# Patient Record
Sex: FEMALE | URBAN - METROPOLITAN AREA
[De-identification: names, ages, dates, MRNs, and addresses within clinical notes are randomized per-mention and may not be internally consistent; named-entity substitution may affect disease eponyms.]

---

## 2022-04-29 LAB
ANTIBODY SCREEN, EXTERNAL: NEGATIVE
CHLAMYDIA, EXTERNAL: NEGATIVE
HBSAG, EXTERNAL: NEGATIVE
HEPATITIS C AB,   EXT: NEGATIVE
HIV, EXTERNAL: NORMAL
N. GONORRHEA, EXTERNAL: NEGATIVE
RUBELLA, EXTERNAL: NORMAL
TYPE, ABO & RH, EXTERNAL: NORMAL

## 2022-10-31 LAB — GRBS, EXTERNAL: NEGATIVE

## 2022-11-27 ENCOUNTER — ANESTHESIA EVENT (OUTPATIENT)
Dept: LABOR AND DELIVERY | Age: 32
End: 2022-11-27

## 2022-11-27 ENCOUNTER — HOSPITAL ENCOUNTER (INPATIENT)
Age: 32
LOS: 2 days | Discharge: HOME | End: 2022-11-29
Attending: OBSTETRICS & GYNECOLOGY | Admitting: OBSTETRICS & GYNECOLOGY

## 2022-11-27 ENCOUNTER — ANESTHESIA (OUTPATIENT)
Dept: LABOR AND DELIVERY | Age: 32
End: 2022-11-27

## 2022-11-27 PROBLEM — Z3A.39 39 WEEKS GESTATION OF PREGNANCY: Status: ACTIVE | Noted: 2022-11-27

## 2022-11-27 LAB
ALBUMIN SERPL-MCNC: 2.7 G/DL (ref 3.5–5)
ALBUMIN/GLOB SERPL: 0.8 {RATIO} (ref 1.1–2.2)
ALP SERPL-CCNC: 154 U/L (ref 45–117)
ALT SERPL-CCNC: 43 U/L (ref 12–78)
ANION GAP SERPL CALC-SCNC: 8 MMOL/L (ref 5–15)
AST SERPL-CCNC: 33 U/L (ref 15–37)
BILIRUB SERPL-MCNC: 0.3 MG/DL (ref 0.2–1)
BUN SERPL-MCNC: 9 MG/DL (ref 6–20)
BUN/CREAT SERPL: 13 (ref 12–20)
CALCIUM SERPL-MCNC: 9.6 MG/DL (ref 8.5–10.1)
CHLORIDE SERPL-SCNC: 106 MMOL/L (ref 97–108)
CO2 SERPL-SCNC: 25 MMOL/L (ref 21–32)
CREAT SERPL-MCNC: 0.68 MG/DL (ref 0.55–1.02)
CREAT UR-MCNC: 208 MG/DL
ERYTHROCYTE [DISTWIDTH] IN BLOOD BY AUTOMATED COUNT: 13.2 % (ref 11.5–14.5)
GLOBULIN SER CALC-MCNC: 3.3 G/DL (ref 2–4)
GLUCOSE SERPL-MCNC: 89 MG/DL (ref 65–100)
HCT VFR BLD AUTO: 36.4 % (ref 35–47)
HGB BLD-MCNC: 12 G/DL (ref 11.5–16)
MCH RBC QN AUTO: 28 PG (ref 26–34)
MCHC RBC AUTO-ENTMCNC: 33 G/DL (ref 30–36.5)
MCV RBC AUTO: 84.8 FL (ref 80–99)
NRBC # BLD: 0 K/UL (ref 0–0.01)
NRBC BLD-RTO: 0 PER 100 WBC
PLATELET # BLD AUTO: 174 K/UL (ref 150–400)
PMV BLD AUTO: 11.4 FL (ref 8.9–12.9)
POTASSIUM SERPL-SCNC: 4.3 MMOL/L (ref 3.5–5.1)
PROT SERPL-MCNC: 6 G/DL (ref 6.4–8.2)
PROT UR-MCNC: 48 MG/DL (ref 0–11.9)
PROT/CREAT UR-RTO: 0.2
RBC # BLD AUTO: 4.29 M/UL (ref 3.8–5.2)
SODIUM SERPL-SCNC: 139 MMOL/L (ref 136–145)
WBC # BLD AUTO: 7.5 K/UL (ref 3.6–11)

## 2022-11-27 PROCEDURE — 65410000002 HC RM PRIVATE OB

## 2022-11-27 PROCEDURE — 74011250637 HC RX REV CODE- 250/637: Performed by: OBSTETRICS & GYNECOLOGY

## 2022-11-27 PROCEDURE — 75410000003 HC RECOV DEL/VAG/CSECN EA 0.5 HR

## 2022-11-27 PROCEDURE — A4300 CATH IMPL VASC ACCESS PORTAL: HCPCS

## 2022-11-27 PROCEDURE — 85027 COMPLETE CBC AUTOMATED: CPT

## 2022-11-27 PROCEDURE — 84156 ASSAY OF PROTEIN URINE: CPT

## 2022-11-27 PROCEDURE — 74011000250 HC RX REV CODE- 250: Performed by: ANESTHESIOLOGY

## 2022-11-27 PROCEDURE — 59025 FETAL NON-STRESS TEST: CPT

## 2022-11-27 PROCEDURE — 76060000078 HC EPIDURAL ANESTHESIA

## 2022-11-27 PROCEDURE — 36415 COLL VENOUS BLD VENIPUNCTURE: CPT

## 2022-11-27 PROCEDURE — 0HQ9XZZ REPAIR PERINEUM SKIN, EXTERNAL APPROACH: ICD-10-PCS | Performed by: OBSTETRICS & GYNECOLOGY

## 2022-11-27 PROCEDURE — 0UQGXZZ REPAIR VAGINA, EXTERNAL APPROACH: ICD-10-PCS | Performed by: OBSTETRICS & GYNECOLOGY

## 2022-11-27 PROCEDURE — 75410000002 HC LABOR FEE PER 1 HR

## 2022-11-27 PROCEDURE — 74011250637 HC RX REV CODE- 250/637: Performed by: ADVANCED PRACTICE MIDWIFE

## 2022-11-27 PROCEDURE — 74011250636 HC RX REV CODE- 250/636: Performed by: OBSTETRICS & GYNECOLOGY

## 2022-11-27 PROCEDURE — 99283 EMERGENCY DEPT VISIT LOW MDM: CPT

## 2022-11-27 PROCEDURE — 80053 COMPREHEN METABOLIC PANEL: CPT

## 2022-11-27 PROCEDURE — 75410000000 HC DELIVERY VAGINAL/SINGLE

## 2022-11-27 PROCEDURE — 0UQJXZZ REPAIR CLITORIS, EXTERNAL APPROACH: ICD-10-PCS | Performed by: OBSTETRICS & GYNECOLOGY

## 2022-11-27 RX ORDER — MAG HYDROX/ALUMINUM HYD/SIMETH 200-200-20
30 SUSPENSION, ORAL (FINAL DOSE FORM) ORAL
Status: DISCONTINUED | OUTPATIENT
Start: 2022-11-27 | End: 2022-11-27

## 2022-11-27 RX ORDER — OXYTOCIN/RINGER'S LACTATE 30/500 ML
10 PLASTIC BAG, INJECTION (ML) INTRAVENOUS AS NEEDED
Status: COMPLETED | OUTPATIENT
Start: 2022-11-27 | End: 2022-11-27

## 2022-11-27 RX ORDER — FENTANYL CITRATE 50 UG/ML
100 INJECTION, SOLUTION INTRAMUSCULAR; INTRAVENOUS ONCE
Status: DISCONTINUED | OUTPATIENT
Start: 2022-11-27 | End: 2022-11-27

## 2022-11-27 RX ORDER — TERBUTALINE SULFATE 1 MG/ML
0.25 INJECTION SUBCUTANEOUS AS NEEDED
Status: DISCONTINUED | OUTPATIENT
Start: 2022-11-27 | End: 2022-11-27

## 2022-11-27 RX ORDER — NALBUPHINE HYDROCHLORIDE 20 MG/ML
10 INJECTION, SOLUTION INTRAMUSCULAR; INTRAVENOUS; SUBCUTANEOUS
Status: DISCONTINUED | OUTPATIENT
Start: 2022-11-27 | End: 2022-11-27

## 2022-11-27 RX ORDER — IBUPROFEN 400 MG/1
800 TABLET ORAL EVERY 8 HOURS
Status: DISCONTINUED | OUTPATIENT
Start: 2022-11-27 | End: 2022-11-29 | Stop reason: HOSPADM

## 2022-11-27 RX ORDER — HYDROCODONE BITARTRATE AND ACETAMINOPHEN 5; 325 MG/1; MG/1
1 TABLET ORAL
Status: DISCONTINUED | OUTPATIENT
Start: 2022-11-27 | End: 2022-11-29 | Stop reason: HOSPADM

## 2022-11-27 RX ORDER — BUPIVACAINE HYDROCHLORIDE 2.5 MG/ML
INJECTION, SOLUTION EPIDURAL; INFILTRATION; INTRACAUDAL AS NEEDED
Status: DISCONTINUED | OUTPATIENT
Start: 2022-11-27 | End: 2022-11-27 | Stop reason: HOSPADM

## 2022-11-27 RX ORDER — NALOXONE HYDROCHLORIDE 0.4 MG/ML
0.4 INJECTION, SOLUTION INTRAMUSCULAR; INTRAVENOUS; SUBCUTANEOUS AS NEEDED
Status: DISCONTINUED | OUTPATIENT
Start: 2022-11-27 | End: 2022-11-27

## 2022-11-27 RX ORDER — NALBUPHINE HYDROCHLORIDE 20 MG/ML
5 INJECTION, SOLUTION INTRAMUSCULAR; INTRAVENOUS; SUBCUTANEOUS
Status: DISCONTINUED | OUTPATIENT
Start: 2022-11-27 | End: 2022-11-27

## 2022-11-27 RX ORDER — OXYTOCIN/RINGER'S LACTATE 30/500 ML
87.3 PLASTIC BAG, INJECTION (ML) INTRAVENOUS AS NEEDED
Status: DISCONTINUED | OUTPATIENT
Start: 2022-11-27 | End: 2022-11-27

## 2022-11-27 RX ORDER — ONDANSETRON 2 MG/ML
4 INJECTION INTRAMUSCULAR; INTRAVENOUS ONCE
Status: ACTIVE | OUTPATIENT
Start: 2022-11-27 | End: 2022-11-27

## 2022-11-27 RX ORDER — FOLIC ACID/MULTIVIT,IRON,MINER 0.4MG-18MG
1 TABLET ORAL DAILY
Status: DISCONTINUED | OUTPATIENT
Start: 2022-11-28 | End: 2022-11-29 | Stop reason: HOSPADM

## 2022-11-27 RX ORDER — LIDOCAINE HYDROCHLORIDE AND EPINEPHRINE 15; 5 MG/ML; UG/ML
INJECTION, SOLUTION EPIDURAL AS NEEDED
Status: DISCONTINUED | OUTPATIENT
Start: 2022-11-27 | End: 2022-11-27 | Stop reason: HOSPADM

## 2022-11-27 RX ORDER — CALCIUM CARBONATE 200(500)MG
400 TABLET,CHEWABLE ORAL AS NEEDED
Status: DISCONTINUED | OUTPATIENT
Start: 2022-11-27 | End: 2022-11-27

## 2022-11-27 RX ORDER — ACETAMINOPHEN 325 MG/1
650 TABLET ORAL
Status: DISCONTINUED | OUTPATIENT
Start: 2022-11-27 | End: 2022-11-29 | Stop reason: HOSPADM

## 2022-11-27 RX ORDER — ASPIRIN 81 MG/1
TABLET ORAL DAILY
COMMUNITY
End: 2022-11-29

## 2022-11-27 RX ORDER — BUPIVACAINE HYDROCHLORIDE 2.5 MG/ML
INJECTION, SOLUTION EPIDURAL; INFILTRATION; INTRACAUDAL
Status: COMPLETED
Start: 2022-11-27 | End: 2022-11-27

## 2022-11-27 RX ORDER — FENTANYL/BUPIVACAINE/NS/PF 2-1250MCG
1-16 PREFILLED PUMP RESERVOIR EPIDURAL CONTINUOUS
Status: DISCONTINUED | OUTPATIENT
Start: 2022-11-27 | End: 2022-11-27

## 2022-11-27 RX ORDER — OXYTOCIN/RINGER'S LACTATE 30/500 ML
87.3 PLASTIC BAG, INJECTION (ML) INTRAVENOUS AS NEEDED
Status: DISCONTINUED | OUTPATIENT
Start: 2022-11-27 | End: 2022-11-29 | Stop reason: HOSPADM

## 2022-11-27 RX ORDER — ONDANSETRON 2 MG/ML
INJECTION INTRAMUSCULAR; INTRAVENOUS
Status: DISCONTINUED
Start: 2022-11-27 | End: 2022-11-27 | Stop reason: WASHOUT

## 2022-11-27 RX ORDER — HYDROCORTISONE ACETATE PRAMOXINE HCL 2.5; 1 G/100G; G/100G
CREAM TOPICAL AS NEEDED
Status: DISCONTINUED | OUTPATIENT
Start: 2022-11-27 | End: 2022-11-29 | Stop reason: HOSPADM

## 2022-11-27 RX ORDER — NALOXONE HYDROCHLORIDE 0.4 MG/ML
0.4 INJECTION, SOLUTION INTRAMUSCULAR; INTRAVENOUS; SUBCUTANEOUS AS NEEDED
Status: DISCONTINUED | OUTPATIENT
Start: 2022-11-27 | End: 2022-11-29 | Stop reason: HOSPADM

## 2022-11-27 RX ORDER — OXYTOCIN/RINGER'S LACTATE 30/500 ML
10 PLASTIC BAG, INJECTION (ML) INTRAVENOUS AS NEEDED
Status: DISCONTINUED | OUTPATIENT
Start: 2022-11-27 | End: 2022-11-29 | Stop reason: HOSPADM

## 2022-11-27 RX ORDER — FENTANYL CITRATE 50 UG/ML
100 INJECTION, SOLUTION INTRAMUSCULAR; INTRAVENOUS
Status: DISCONTINUED | OUTPATIENT
Start: 2022-11-27 | End: 2022-11-27

## 2022-11-27 RX ORDER — SIMETHICONE 80 MG
80 TABLET,CHEWABLE ORAL
Status: DISCONTINUED | OUTPATIENT
Start: 2022-11-27 | End: 2022-11-29 | Stop reason: HOSPADM

## 2022-11-27 RX ORDER — SODIUM CHLORIDE, SODIUM LACTATE, POTASSIUM CHLORIDE, CALCIUM CHLORIDE 600; 310; 30; 20 MG/100ML; MG/100ML; MG/100ML; MG/100ML
25 INJECTION, SOLUTION INTRAVENOUS CONTINUOUS
Status: DISCONTINUED | OUTPATIENT
Start: 2022-11-27 | End: 2022-11-27

## 2022-11-27 RX ORDER — NORETHINDRONE AND ETHINYL ESTRADIOL 0.5-0.035
10 KIT ORAL ONCE
Status: DISCONTINUED | OUTPATIENT
Start: 2022-11-27 | End: 2022-11-27

## 2022-11-27 RX ORDER — NORETHINDRONE AND ETHINYL ESTRADIOL 0.5-0.035
KIT ORAL
Status: DISCONTINUED
Start: 2022-11-27 | End: 2022-11-27 | Stop reason: WASHOUT

## 2022-11-27 RX ADMIN — ACETAMINOPHEN 650 MG: 325 TABLET ORAL at 23:33

## 2022-11-27 RX ADMIN — OXYTOCIN 10000 MILLI-UNITS: 10 INJECTION, SOLUTION INTRAMUSCULAR; INTRAVENOUS at 14:44

## 2022-11-27 RX ADMIN — Medication 10 ML/HR: at 05:11

## 2022-11-27 RX ADMIN — ALUMINUM HYDROXIDE, MAGNESIUM HYDROXIDE, AND SIMETHICONE 30 ML: 200; 200; 20 SUSPENSION ORAL at 08:45

## 2022-11-27 RX ADMIN — ACETAMINOPHEN 650 MG: 325 TABLET ORAL at 17:19

## 2022-11-27 RX ADMIN — CALCIUM CARBONATE 400 MG: 500 TABLET, CHEWABLE ORAL at 07:25

## 2022-11-27 RX ADMIN — Medication 10 ML/HR: at 11:45

## 2022-11-27 RX ADMIN — FENTANYL CITRATE 50 MCG: 50 INJECTION, SOLUTION INTRAMUSCULAR; INTRAVENOUS at 04:57

## 2022-11-27 RX ADMIN — SODIUM CHLORIDE, POTASSIUM CHLORIDE, SODIUM LACTATE AND CALCIUM CHLORIDE 1000 ML: 600; 310; 30; 20 INJECTION, SOLUTION INTRAVENOUS at 05:00

## 2022-11-27 RX ADMIN — LIDOCAINE HYDROCHLORIDE,EPINEPHRINE BITARTRATE 1 ML: 15; .005 INJECTION, SOLUTION EPIDURAL; INFILTRATION; INTRACAUDAL; PERINEURAL at 04:57

## 2022-11-27 RX ADMIN — BUPIVACAINE HYDROCHLORIDE 1 ML: 2.5 INJECTION, SOLUTION EPIDURAL; INFILTRATION; INTRACAUDAL; PERINEURAL at 04:57

## 2022-11-27 RX ADMIN — CALCIUM CARBONATE 400 MG: 500 TABLET, CHEWABLE ORAL at 02:00

## 2022-11-27 RX ADMIN — IBUPROFEN 800 MG: 400 TABLET, FILM COATED ORAL at 17:19

## 2022-11-27 NOTE — ANESTHESIA PREPROCEDURE EVALUATION
Relevant Problems   No relevant active problems       Anesthetic History               Review of Systems / Medical History  Patient summary reviewed, nursing notes reviewed and pertinent labs reviewed    Pulmonary            Asthma        Neuro/Psych              Cardiovascular  Within defined limits                Exercise tolerance: >4 METS     GI/Hepatic/Renal  Within defined limits              Endo/Other  Within defined limits           Other Findings              Physical Exam    Airway  Mallampati: II  TM Distance: 4 - 6 cm  Neck ROM: normal range of motion        Cardiovascular  Regular rate and rhythm,  S1 and S2 normal,  no murmur, click, rub, or gallop             Dental  No notable dental hx       Pulmonary                 Abdominal         Other Findings            Anesthetic Plan    ASA: 2  Anesthesia type: CSE            Anesthetic plan and risks discussed with: Patient

## 2022-11-27 NOTE — ANESTHESIA PROCEDURE NOTES
CSE Block    Start time: 11/27/2022 4:47 AM  End time: 11/27/2022 5:01 AM  Performed by: Froylan Soni DO  Authorized by: Froylan Soin DO     Pre-Procedure  Indications: procedure for pain    preanesthetic checklist: patient identified, risks and benefits discussed, anesthesia consent, site marked, patient being monitored, timeout performed and fire risk safety assessment completed and verbalized      Procedure:   Patient Position:  Seated  Prep Region:  Lumbar  Prep: chlorhexidine    Location:  L3-4    Epidural Needle:   Needle Type:  Tuohy  Needle Gauge:  17 G  Injection Technique:  Loss of resistance using air  Attempts:  1    Spinal Needle:   Needle Type:  Pencan  Needle Gauge:  25 G    Catheter:   Catheter Type:  Flex-tip  Catheter Size:  18 G  Catheter at Skin Depth (cm):  10.5  Depth in Epidural Space (cm):  4  Events: no blood with aspiration and CSF confirmed    Med Admin time: 11/27/2022 4:57 AM    Assessment:   Catheter Secured:  Tegaderm  Insertion:  Uncomplicated  Patient tolerance:  Patient tolerated the procedure well with no immediate complications  Meds:    2.0 ml of 1% lidocaine plain for skin & subQ    - 1.0 ml of 0.25% marcaine isobaric for CSE  - 1.0 ml of 1.5% lidocaine w/ epi 1:200,000 for test dose  - 50 mcg fentanyl in epidural

## 2022-11-27 NOTE — ED PROVIDER NOTES
34 yo  @ 40w1d, pt of 606/706 Mireille Nayak, presents with ROM around 11 PM. Clear fluid. Uncomplicated pregnancy. She did have COVID during pregnancy and was taking baby ASA. GBS neg. She did have mildly elevated LFTs during pregnancy which normalized at 36 weeks. PMedHx: Anxiety  PSURGHX: laparoscopic appendectomy    The history is provided by the patient. Chief Complaint   Patient presents with    Leakage/Loss of Fluid       No past medical history on file. No past surgical history on file. No family history on file. Social History     Socioeconomic History    Marital status: Not on file     Spouse name: Not on file    Number of children: Not on file    Years of education: Not on file    Highest education level: Not on file   Occupational History    Not on file   Tobacco Use    Smoking status: Not on file    Smokeless tobacco: Not on file   Substance and Sexual Activity    Alcohol use: Not on file    Drug use: Not on file    Sexual activity: Not on file   Other Topics Concern    Not on file   Social History Narrative    Not on file     Social Determinants of Health     Financial Resource Strain: Not on file   Food Insecurity: Not on file   Transportation Needs: Not on file   Physical Activity: Not on file   Stress: Not on file   Social Connections: Not on file   Intimate Partner Violence: Not on file   Housing Stability: Not on file         ALLERGIES: Patient has no known allergies. Review of Systems   All other systems reviewed and are negative. There were no vitals filed for this visit. Physical Exam  Vitals and nursing note reviewed. Exam conducted with a chaperone present. Constitutional:       General: She is not in acute distress. Appearance: Normal appearance. She is not ill-appearing. Cardiovascular:      Rate and Rhythm: Normal rate and regular rhythm. Pulmonary:      Effort: Pulmonary effort is normal.   Abdominal:      Palpations: Abdomen is soft.    Genitourinary: Comments: Grossly ruptured  Cvx 3/80/-2  Neurological:      Mental Status: She is alert. FHTs 130s, moderate variability, (+) accels, no decels, ctxs q3-5 min    MDM     Amount and/or Complexity of Data Reviewed  Review and summarize past medical records: yes    Risk of Complications, Morbidity, and/or Mortality  Presenting problems: moderate  Diagnostic procedures: moderate  Management options: moderate              ED Course as of 22 0025   Sun 2022   0024 34 yo  @ 40w1d SROM and early labor. Admit for birth.  [NR]      ED Course User Index  [NR] Lidia Cast MD       Procedures    @[facility]@

## 2022-11-27 NOTE — PROGRESS NOTES
0730-Bedside and Verbal shift change report given to SHARATH Patino RN  (oncoming nurse) by HIPOLITO Black RN  (offgoing nurse). Report included the following information SBAR.   0945-Straight cath done. P/C ratio sent. 0950-Pt feeling some discomfort on right side with contractions. Turned to right side and will use PCEA. 1020-Pt sleeping. 1300-RN at bedside, continuously monitoring FHR tracing   1330-RN at bedside, continuously monitoring FHR tracing   1400-RN at bedside, continuously monitoring FHR tracing   1430-RN at bedside, continuously monitoring FHR tracing  1439-, see delivery summary. 1705-Pt up to WC, no complaints voiced. 1710-TRANSFER - OUT REPORT:    Verbal report given to A Celso RN (name) on Suni Roberson  being transferred to MIU room 326 (unit) for routine progression of care       Report consisted of patients Situation, Background, Assessment and   Recommendations(SBAR). Information from the following report(s) SBAR was reviewed with the receiving nurse. Lines:   Peripheral IV 22 Left Hand (Active)        Opportunity for questions and clarification was provided.       Patient transported with:   Registered Nurse

## 2022-11-27 NOTE — PROGRESS NOTES
[facility] Labor Progress Note     Patient: Max Ontiveros MRN: [id number]  SSN: [id number]    YOB: 1990  Age: 33 y.o. Sex: female      Care assumed at 0800    Subjective:   Patient is comfortable with epidural.  and  are at bedside and are both supportive and encouraging. Objective:   Blood pressure 122/80, pulse 99, temperature 98 °F (36.7 °C), resp. rate 16, height 5' 6\" (1.676 m), weight 83.9 kg (185 lb), SpO2 98 %, not currently breastfeeding. Patient Vitals for the past 4 hrs: Mode Fetal Heart Rate Fetal Activity Variability Decelerations Accelerations RN Reviewed Strip? Non Stress Test   22 0817 US Readjusted; External 140 -- -- -- -- Yes --   22 0800 External 140 -- 6-25 BPM Variable Yes Yes --   22 0730 External 135 -- 6-25 BPM -- -- Yes --   22 0700 External 135 Present 6-25 BPM None Yes Yes Reactive   22 0630 External 130 Present 6-25 BPM -- -- Yes --   22 0600 External 130 Present 6-25 BPM -- Yes Yes Reactive   22 0530 External 140 Present 6-25 BPM -- -- Yes --   22 0514 External 140 Present 6-25 BPM Early -- Yes --      Uterine contractions q 2-4 minutes, strong to palpation, resting tone soft, Sterile Vaginal Exam: 7 cm dilated/ 100 % effaced/ +1 station, fetal presentation vertex, membranes ruptured for continued clear fluid    Assessment:     40w1d  Category 1 fetal heart rate tracing   Labor  SROM x 10 hours    Plan:   Introduced self to patient. SVE. Recheck cervix in 3 hours, sooner with maternal or fetal indication. Cont maternal and fetal monitoring per protocol.   Anticipate HERMES Alvarez

## 2022-11-27 NOTE — ROUTINE PROCESS
TRANSFER - IN REPORT:    Verbal report received from Nithya Bautista RN (name) on Kendra Shaver  being received from LD (unit) for routine progression of care      Report consisted of patients Situation, Background, Assessment and   Recommendations(SBAR). Information from the following report(s) SBAR was reviewed with the receiving nurse. Opportunity for questions and clarification was provided. Assessment completed upon patients arrival to unit and care assumed.

## 2022-11-27 NOTE — H&P
History & Physical    Name: Suni Roberson MRN: [id number]  SSN: [id number]    YOB: 1990  Age: 33 y.o. Sex: female        Subjective: SROM and labor     Estimated Date of Delivery: 22  OB History    Para Term  AB Living   1             SAB IAB Ectopic Molar Multiple Live Births                    # Outcome Date GA Lbr Tristan/2nd Weight Sex Delivery Anes PTL Lv   1 Current                MsRachel Rosado is a  admitted from [facility] with pregnancy at 40w1d for  ROM and early labor . Prenatal course was pretty uncomplicated. She did have COVID and was on a baby ASA. Also had mildly elevated LFTs which normalized. Please see prenatal records for details. H/o anxiety  H/o laparoscopic appy      Past Medical History:   Diagnosis Date    Asthma      Past Surgical History:   Procedure Laterality Date    HX OTHER SURGICAL      Sabrina bar     Social History     Occupational History    Not on file   Tobacco Use    Smoking status: Not on file    Smokeless tobacco: Not on file   Substance and Sexual Activity    Alcohol use: Not on file    Drug use: Not on file    Sexual activity: Not on file     No family history on file. No Known Allergies  Prior to Admission medications    Medication Sig Start Date End Date Taking? Authorizing Provider   PNV Comb #2-Iron-FA-Omega 3 29-1-400 mg cmpk Take  by mouth. Yes Provider, Historical   aspirin delayed-release 81 mg tablet Take  by mouth daily. Yes Provider, Historical        Review of Systems: A comprehensive review of systems was negative except for that written in the HPI. Objective:     Vitals: There were no vitals filed for this visit. Physical Exam:  Patient without distress. Heart: Regular rate and rhythm  Lung: normal respiratory effort  Abdomen: soft, nontender  Perineum: blood absent, amniotic fluid present  Cervical Exam: 3/70/-2  Membranes:  Spontaneous Rupture of Membranes;  Amniotic Fluid: clear fluid  Fetal Heart Rate: Reactive  Baseline: 130 per minute  Variability: moderate  Accelerations: yes  Decelerations: none  Uterine contractions: q2-5 min      Assessment/Plan:     Active Problems:    39 weeks gestation of pregnancy (2022)       34 yo  @ 40w1d SROM and early labor  Admit  GBS neg  She is open to epidural if she feels like she needs it  Mildly elevated first blood pressure, will cycle and check labs

## 2022-11-27 NOTE — L&D DELIVERY NOTE
CNM Delivery Note       Patient: Antonio Gonzalez MRN: [id number]  SSN: [id number]    YOB: 1990  Age: 33 y.o. Sex: female        Complete cervical dilation at 1257.  of a live female infant at 26 with Apgars 8,9 in ANNA position under epidural anesthesia with mother in stirrups position. Loose nuchal x 1 noted and easily reduced on perineum. Shoulders spontaneous, easily delivered with maternal effort. Vigorous infant placed on maternal abdomen immediately following delivery. Once cord stopped pulsating it was double clamped by CNM and cut by FOB. Placenta and membranes spontaneous, intact, with 3 vessel cord via Dayanara Balls mechanism at [phone]. Fundus massaged to firm. Vagina and perineum inspected. Right vaginal laceration noted, bleeding, repaired with one stitch of 3-0 vicryl to great hemostasis. 1st degree perineal laceration repaired with 2-0 vicryl rapide under epidural anesthesia to great approximation, hemostasis and cosmesis. Bilateral periurethral skin tears noted, hemostatic, no repair needed. Small laceration just under clitoris noted and bleeding, repaired with two stitches of 4-0 monocryl to great hemostasis. Bladder emptied in sterile fashion with a red rubber catheter. Mother and infant stable, bonding, establishing breastfeeding. Delivery Summary    Patient: Antonio Gonzalez MRN: [id number]  SSN: [id number]    YOB: 1990  Age: 33 y.o.   Sex: female       Information for the patient's :  Leah Boyd [[id number]]     Labor Events:    Labor: No    Steroids: None   Cervical Ripening Date/Time:       Cervical Ripening Type: None   Antibiotics During Labor:     Rupture Identifier:      Rupture Date/Time: 2022 11:00 PM   Rupture Type: SROM   Amniotic Fluid Volume:      Amniotic Fluid Description:      Amniotic Fluid Odor:      Induction: None       Induction Date/Time:        Indications for Induction:      Augmentation: None Augmentation Date/Time:      Indications for Augmentation:     Labor complications: None       Additional complications:        Delivery Events:  Indications For Episiotomy:     Episiotomy: None   Perineal Laceration(s): 1st   Repaired: Yes   Periurethral Laceration Location:      Repaired: Yes   Labial Laceration Location:     Repaired:     Sulcal Laceration Location:     Repaired:     Vaginal Laceration Location: right   Repaired: Yes   Cervical Laceration Location:     Repaired:     Repair Suture: Vicryl 2-0;Vicryl 3-0;Monocryl 3-0   Number of Repair Packets: 3   Estimated Blood Loss (ml):  ml   Quantitative Blood Loss (ml)                Delivery Date: 2022    Delivery Time: 2:39 PM  Delivery Type: Vaginal, Spontaneous  Sex:  Female    Gestational Age: 44w3d   Delivery Clinician:  Marvin Torres  Living Status: Living   Delivery Location: L&D            APGARS  One minute Five minutes Ten minutes   Skin color: 0   1        Heart rate: 2   2        Grimace: 2   2        Muscle tone: 2   2        Breathin   2        Totals: 8   9            Presentation: Vertex    Position: Left Occiput Anterior  Resuscitation Method:  Suctioning-bulb; Tactile Stimulation     Meconium Stained: Terminal      Cord Information: 3 Vessels  Complications: Nuchal Cord Without Compressions  Cord around: head  Delayed cord clamping? Yes  Cord clamped date/time:2022  2:40 PM  Disposition of Cord Blood:      Blood Gases Sent?: No    Placenta:  Date/Time: 2022  2:45 PM  Removal: Spontaneous      Appearance: Normal;Intact      Measurements:  Birth Weight:        Birth Length:        Head Circumference:        Chest Circumference:       Abdominal Girth:       Other Providers:   ;BECKY Chou;;;;;;Birt GRIJALVA, Obstetrician;Primary Nurse;Primary [place] Nurse;Nicu Nurse;Neonatologist;Anesthesiologist;Crna;Nurse Practitioner;Midwife;Nursery Nurse         Group B Strep:   Lab Results   Component Value Date/Time    GrBStrep, External Negative 10/31/2022 12:00 AM     Information for the patient's :  Meena Lacy [[id number]]   No results found for: ABORH, PCTABR, PCTDIG, BILI, ABORHEXT, ABORH   No results for input(s): PCO2CB, PO2CB, HCO3I, SO2I, IBD, PTEMPI, SPECTI, PHICB, ISITE, IDEV, IALLEN in the last 72 hours.

## 2022-11-27 NOTE — PROGRESS NOTES
[facility] Labor Progress Note     Patient: Wero Waldron MRN: [id number]  SSN: [id number]    YOB: 1990  Age: 33 y.o. Sex: female        Subjective:   Patient continues to be comfortable with epidural, feeling a lot of pressure.  and  continue to offer support and encouragement at bedside. Objective:   Blood pressure 122/65, pulse (!) 109, temperature 98.3 °F (36.8 °C), resp. rate 16, height 5' 6\" (1.676 m), weight 83.9 kg (185 lb), SpO2 99 %, not currently breastfeeding. Patient Vitals for the past 4 hrs: Mode Fetal Heart Rate Variability Decelerations Accelerations RN Reviewed Strip?   22 1315 External 135 6-25 BPM None Yes Yes   22 1259 External 140 6-25 BPM None Yes Yes   22 1230 External 145 6-25 BPM None Yes Yes   22 1200 External 140 6-25 BPM None No Yes   22 1137 US Readjusted 145 -- -- -- --   22 1129 External 145 6-25 BPM None Yes Yes   22 1100 External 135 6-25 BPM None Yes Yes   22 1029 External 145 (!) Less than or equal to 5 BPM Early No Yes   22 0959 External 150 6-25 BPM None Yes Yes   22 0929 External 145 6-25 BPM None Yes Yes      Uterine contractions q 2-5 minutes,  strong to palpation, resting tone soft, Sterile Vaginal Exam: 10 cm dilated/ 100 % effaced/ +2 station, fetal presentation vertex, membranes ruptured for continued clear fluid. Assessment:     40w1d  Category 1 fetal heart rate tracing   Labor     Plan:   Complete! Start pushing!   Anticipate     Angie Blue

## 2022-11-27 NOTE — PROGRESS NOTES
2356: Pt arrived to [facility] with complaint of rupture of membranes at 11pm.  Pt reports a moderate amount of clear fluid which was slightly blood-tinged. Pt denies significant vaginal bleeding, states positive fetal movement, and denies any complications with this pregnancy. 0010: Gross rupture noted. 0013: Calling MD to bedside for evaluation. 0020: Dr. Liudmila Flood at bedside for assessment. 0023: SVE 3/80/-2 per MD    0030: MD discussing plan to admit to L&D for labor. Pt given opportunity to ask questions and verbalized understanding. 1339: Pt transferred to L&D Rm4 from JIMMY    0104: PIV and labs sent    0110: Bedside SBAR report given to HIPOLITO Matias

## 2022-11-28 PROCEDURE — 74011250637 HC RX REV CODE- 250/637: Performed by: ADVANCED PRACTICE MIDWIFE

## 2022-11-28 PROCEDURE — 65410000002 HC RM PRIVATE OB

## 2022-11-28 RX ORDER — IBUPROFEN 600 MG/1
600 TABLET ORAL
Qty: 30 TABLET | Refills: 1 | Status: SHIPPED | OUTPATIENT
Start: 2022-11-28 | End: 2022-11-28 | Stop reason: SDUPTHER

## 2022-11-28 RX ORDER — IBUPROFEN 600 MG/1
600 TABLET ORAL
Qty: 30 TABLET | Refills: 1 | Status: SHIPPED | OUTPATIENT
Start: 2022-11-28

## 2022-11-28 RX ADMIN — ACETAMINOPHEN 650 MG: 325 TABLET ORAL at 23:21

## 2022-11-28 RX ADMIN — IBUPROFEN 800 MG: 400 TABLET, FILM COATED ORAL at 18:02

## 2022-11-28 RX ADMIN — IBUPROFEN 800 MG: 400 TABLET, FILM COATED ORAL at 01:47

## 2022-11-28 RX ADMIN — IBUPROFEN 800 MG: 400 TABLET, FILM COATED ORAL at 09:50

## 2022-11-28 RX ADMIN — ACETAMINOPHEN 650 MG: 325 TABLET ORAL at 16:40

## 2022-11-28 RX ADMIN — ACETAMINOPHEN 650 MG: 325 TABLET ORAL at 05:52

## 2022-11-28 RX ADMIN — ACETAMINOPHEN 650 MG: 325 TABLET ORAL at 09:50

## 2022-11-28 NOTE — PROGRESS NOTES
Post-Partum Day Number 1 Progress Note    Kathie Millan     Assessment: Doing well, post partum day 1    Plan:  - Continue routine postpartum and perineal care as well as maternal education.  - N/A   - Plan discharge home [address] Discharge Date: Tomorrow. Information for the patient's :  Lucila Hernandez [[id number]]   Vaginal, Spontaneous  Patient doing well without significant complaint. Voiding without difficulty, normal lochia. Vitals:  Visit Vitals  /88 (BP 1 Location: Right upper arm, BP Patient Position: At rest;Sitting)   Pulse 78   Temp 97.5 °F (36.4 °C)   Resp 17   Ht 5' 6\" (1.676 m)   Wt 83.9 kg (185 lb)   SpO2 98%   Breastfeeding Unknown   BMI 29.86 kg/m²     Temp (24hrs), Av °F (36.7 °C), Min:97.3 °F (36.3 °C), Max:98.7 °F (37.1 °C)        Exam:   Patient without distress. Fundus firm, nontender per nursing fundal checks. Perineum with normal lochia noted per nursing assessment. Lower extremities are negative for pathological edema. Labs:     Lab Results   Component Value Date/Time    WBC 7.5 2022 01:05 AM    HGB 12.0 2022 01:05 AM    HCT 36.4 2022 01:05 AM    PLATELET 741  01:05 AM       Recent Results (from the past 24 hour(s))   PROTEIN/CREATININE RATIO, URINE    Collection Time: 22 10:04 AM   Result Value Ref Range    Protein, urine random 48 (H) 0.0 - 11.9 mg/dL    Creatinine, urine random 208.00 mg/dL    Protein/Creat.  urine Ratio 0.2

## 2022-11-28 NOTE — LACTATION NOTE
This note was copied from a baby's chart. Infant nursing at the time of my visit, using the nipple shield on the left breast. Evidence of colostrum noted inn shield upon release. Assisted mom with positioning in the football position on the right breast. Infant latched directly to breast with rhythmic sucking and swallows noted. 31-Oct-2021 19:11

## 2022-11-28 NOTE — ROUTINE PROCESS
Bedside shift change report given to A Car Fontenot RN (oncoming nurse) by Emily Mejia RN (offgoing nurse). Report included the following information SBAR.

## 2022-11-28 NOTE — LACTATION NOTE
This note was copied from a baby's chart. Initial Lactation Consultation - Baby born vaginally this afternoon to a  mom at 36 1/7 weeks gestation. Mom noticed breast changes during her pregnancy. She has large breasts and flatter nipples. She has been struggling to get baby latched deeply. She has a small bruise on her left nipple from an improper latch. I gave mom a nipple shield and showed her how to use it. Baby was able to get a deep latch on the shield. She was sucking rhythmically with audible swallows. Mom said the latch was more comfortable with the shield. Feeding Plan: Mother will keep baby skin to skin as often as possible, feed on demand, respond to feeding cues, obtain latch, listen for audible swallowing, be aware of signs of oxytocin release/ cramping, thirst and sleepiness while breastfeeding. Mom will not limit the time the baby is at the breast. She will use the shield and offer both breasts at each feeding.

## 2022-11-28 NOTE — DISCHARGE INSTRUCTIONS
POSTPARTUM DISCHARGE INSTRUCTIONS       Name:  Antonio Gonzalez  YOB: 1990  Admission Diagnosis:  39 weeks gestation of pregnancy [Z3A.39]     Discharge Diagnosis:  [unfilled]  Attending Physician:  [unfilled]    Delivery Type:  {Postpartum Delivery Types:79251}     Additional Information:  {Postpartum Pregnancy Complications:90440}    These are general instructions for a healthy lifestyle:    No smoking/ No tobacco products/ Avoid exposure to second hand smoke    Surgeon General's Warning:  Quitting smoking now greatly reduces serious risk to your health. Obesity, smoking, and sedentary lifestyle greatly increases your risk for illness    A healthy diet, regular physical exercise & weight monitoring are important for maintaining a healthy lifestyle    Recognize signs and symptoms of STROKE:    F-face looks uneven    A-arms unable to move or move unevenly    S-speech slurred or non-existent    T-time-call 911 as soon as signs and symptoms begin - DO NOT go       back to bed or wait to see if you get better - TIME IS BRAIN. I have had the opportunity to make my options or choices for discharge. I have received and understand these instructions. Postpartum Support Groups   We know that all of us are dealing with a tremendous amount of uncertainty, confusion and disruption to our daily lives, which may result in increased anxiety, depression and fear. If you are feeling unsettled or worse, please know that we are here to help. During this time of increased caution and care for one another, Postpartum [facility] [place] ([facility]) is offering virtual and in person support groups to ALL MOTHERS in [place] regardless of the age of your child/children as a way to help weather this emotional storm together. Social support is an important part of self-care during this time of physical distancing. Virtual postpartum support group meetings available at www. postpartumva.org  Warm Line: 193.399.1218    Breastfeeding Support Groups   1st and 3rd Wednesday of each month at [place]  2nd and 4th Tuesday of each month at [employer] (in education center behind cafeteria)    [place] at www.[employer] under the \"About Us\" and \"Classes and Events tabs\"

## 2022-11-28 NOTE — ROUTINE PROCESS
Bedside shift change report given to SHARATH Soriano RN (oncoming nurse) by JOSE DAVID Houston RN (offgoing nurse). Report included the following information SBAR and Kardex.

## 2022-11-28 NOTE — DISCHARGE SUMMARY
Obstetrical Discharge Summary     Name: Tomasz Joaquin MRN: [id number]  SSN: [id number]    YOB: 1990  Age: 33 y.o. Sex: female      Admit Date: 2022    Discharge Date: 2022    Admitting Physician: Ladi Moses MD     Attending Physician:  Renato Yan MD     Admission Diagnoses: 39 weeks gestation of pregnancy [Z3A.39]    Discharge Diagnoses:   Information for the patient's :  Sari Day [[id number]]   Delivery of a 3.74 kg female infant via Vaginal, Spontaneous on 2022 at 2:39 PM  by Leslie Ramirez. Apgars were 8  and 9 . Additional Diagnoses:   Hospital Problems  Never Reviewed            Codes Class Noted POA    39 weeks gestation of pregnancy ICD-10-CM: Z3A.39  ICD-9-CM: V22.2  2022 Unknown          Lab Results   Component Value Date/Time    Rubella, External Non-Immune 2022 12:00 AM    GrBStrep, External Negative 10/31/2022 12:00 AM       Hospital Course: Normal hospital course following the delivery. Patient Instructions:   Current Discharge Medication List        START taking these medications    Details   ibuprofen (MOTRIN) 600 mg tablet Take 1 Tablet by mouth every six (6) hours as needed for Pain. Qty: 30 Tablet, Refills: 1           CONTINUE these medications which have NOT CHANGED    Details   PNV Comb #2-Iron-FA-Omega 3 29-1-400 mg cmpk Take  by mouth. STOP taking these medications       aspirin delayed-release 81 mg tablet Comments:   Reason for Stopping:               Disposition at Discharge: Home or self care    Condition at Discharge: Stable    Reference my discharge instructions.     Follow-up Appointments   Procedures    FOLLOW UP VISIT Appointment in: 6 Weeks     Standing Status:   Standing     Number of Occurrences:   1     Order Specific Question:   Appointment in     Answer:   6 Weeks        Signed By:  Rachael Leslie MD     2022

## 2022-11-29 VITALS
BODY MASS INDEX: 29.73 KG/M2 | TEMPERATURE: 97.4 F | HEIGHT: 66 IN | DIASTOLIC BLOOD PRESSURE: 85 MMHG | OXYGEN SATURATION: 98 % | WEIGHT: 185 LBS | SYSTOLIC BLOOD PRESSURE: 128 MMHG | RESPIRATION RATE: 16 BRPM | HEART RATE: 70 BPM

## 2022-11-29 PROCEDURE — 74011250637 HC RX REV CODE- 250/637: Performed by: ADVANCED PRACTICE MIDWIFE

## 2022-11-29 PROCEDURE — 74011250636 HC RX REV CODE- 250/636: Performed by: ADVANCED PRACTICE MIDWIFE

## 2022-11-29 PROCEDURE — 90707 MMR VACCINE SC: CPT | Performed by: ADVANCED PRACTICE MIDWIFE

## 2022-11-29 RX ADMIN — ACETAMINOPHEN 650 MG: 325 TABLET ORAL at 08:16

## 2022-11-29 RX ADMIN — IBUPROFEN 800 MG: 400 TABLET, FILM COATED ORAL at 02:31

## 2022-11-29 RX ADMIN — MEASLES, MUMPS, AND RUBELLA VIRUS VACCINE LIVE 0.5 ML: 1000; 12500; 1000 INJECTION, POWDER, LYOPHILIZED, FOR SUSPENSION SUBCUTANEOUS at 11:11

## 2022-11-29 RX ADMIN — IBUPROFEN 800 MG: 400 TABLET, FILM COATED ORAL at 10:28

## 2022-11-29 RX ADMIN — Medication 1 TABLET: at 11:10

## 2022-11-29 NOTE — LACTATION NOTE
This note was copied from a baby's chart. Mom and baby scheduled for discharge today. I did not see the baby at the breast. Mom states baby has nursing well and has improved throughout post partum stay, deep latch maintained, mother is comfortable, milk is in transition, baby feeding vigorously with rhythmic suck, swallow, breathe pattern, with audible swallowing, and evident milk transfer, both breasts offered, baby is asleep following feeding. Baby is feeding on demand. We reviewed cluster feeding, engorgement and pumping. Breast feeding teaching completed and all questions answered.

## 2022-11-29 NOTE — ROUTINE PROCESS
Bedside and Verbal shift change report given to Jesus Dickerson RN (oncoming nurse) by JOSE DAVID Soler RN (offgoing nurse). Report included the following information SBAR.

## 2022-11-29 NOTE — PROGRESS NOTES
Post-Partum Day Number 2 Progress Note    Kathie Millan     Assessment: Doing well, post partum day 2    Plan:   - Discharge home today  - Follow up in office in 6 week(s) with [facility].  - Pain medication prescription(s) sent. - Questions answered. Information for the patient's :  Enio Hoffman [[id number]]   Vaginal, Spontaneous  Patient doing well without significant complaint. Voiding without difficulty, normal lochia. Ready for discharge home. Vitals:  Visit Vitals  /85 (BP 1 Location: Right arm, BP Patient Position: At rest)   Pulse 70   Temp 97.4 °F (36.3 °C)   Resp 16   Ht 5' 6\" (1.676 m)   Wt 83.9 kg (185 lb)   SpO2 98%   Breastfeeding Unknown   BMI 29.86 kg/m²     Temp (24hrs), Av.6 °F (36.4 °C), Min:97.4 °F (36.3 °C), Max:97.8 °F (36.6 °C)      Exam:        Patient without distress. Fundus firm, nontender per nursing fundal checks                Perineum with normal lochia noted per nursing assessment                Lower extremities are negative for pathological edema    Labs:     Lab Results   Component Value Date/Time    WBC 7.5 2022 01:05 AM    HGB 12.0 2022 01:05 AM    HCT 36.4 2022 01:05 AM    PLATELET 940  01:05 AM       No results found for this or any previous visit (from the past 24 hour(s)).